# Patient Record
Sex: FEMALE | Race: WHITE | NOT HISPANIC OR LATINO | URBAN - METROPOLITAN AREA
[De-identification: names, ages, dates, MRNs, and addresses within clinical notes are randomized per-mention and may not be internally consistent; named-entity substitution may affect disease eponyms.]

---

## 2019-04-22 NOTE — PATIENT DISCUSSION
Continue: erythromycin (erythromycin): ointment: 5 mg/gram (0.5 %) a small amount twice a day as directed into both eyes 04-

## 2019-08-05 NOTE — PATIENT DISCUSSION
DRY EYES : Discussed with patient the importance of keeping the eye moist and the symptoms associated with dry eyes including blurry vision, tearing, burning, and kristan sensation. Advised patient to minimize use of any fans blowing directly on the face. Advised patient to continue with artificial tears 2-3 times daily.

## 2020-11-23 NOTE — PATIENT DISCUSSION
mythenia gravis ou; Reffering out to Dr. Benitez Latham with [facility]  [phone]

## 2020-11-23 NOTE — PATIENT DISCUSSION
Myasthenia gravis is a chronic autoimmune, neuromuscular disease that causes weakness in the skeletal muscles that worsens after periods of activity and improves after periods of rest. These muscles are responsible for functions involving breathing and moving parts of the body, including the arms and legs.

## 2020-11-23 NOTE — PATIENT DISCUSSION
Patient had Ptosis repair on 4/17/2019 she states that her lid height fluctuates . Ice pack was applied over eye's in office today and lid height raised.

## 2021-02-25 ENCOUNTER — IMPORTED ENCOUNTER (OUTPATIENT)
Dept: URBAN - METROPOLITAN AREA CLINIC 50 | Facility: CLINIC | Age: 81
End: 2021-02-25

## 2021-04-17 ASSESSMENT — TONOMETRY
OS_IOP_MMHG: 13
OD_IOP_MMHG: 14

## 2021-04-17 ASSESSMENT — VISUAL ACUITY
OD_OTHER: 20/40. 20/50.
OD_CC: 20/70
OS_BAT: 20/30
OS_CC: J1
OD_CC: J1
OS_OTHER: 20/30. 20/40.
OS_CC: 20/30-
OD_BAT: 20/40

## 2022-02-17 ENCOUNTER — FOLLOW UP (OUTPATIENT)
Dept: URBAN - METROPOLITAN AREA CLINIC 49 | Facility: CLINIC | Age: 82
End: 2022-02-17

## 2022-02-17 DIAGNOSIS — H04.123: ICD-10-CM

## 2022-02-17 DIAGNOSIS — H43.813: ICD-10-CM

## 2022-02-17 PROCEDURE — 92014 COMPRE OPH EXAM EST PT 1/>: CPT

## 2022-02-17 ASSESSMENT — VISUAL ACUITY
OD_CC: 20/30
OS_PH: 20/30
OS_CC: 20/40

## 2022-02-17 ASSESSMENT — TONOMETRY
OD_IOP_MMHG: 14
OS_IOP_MMHG: 14

## 2022-09-14 ENCOUNTER — COMPREHENSIVE EXAM (OUTPATIENT)
Dept: URBAN - METROPOLITAN AREA CLINIC 49 | Facility: CLINIC | Age: 82
End: 2022-09-14

## 2022-09-14 DIAGNOSIS — H04.123: ICD-10-CM

## 2022-09-14 DIAGNOSIS — H43.813: ICD-10-CM

## 2022-09-14 DIAGNOSIS — H51.11: ICD-10-CM

## 2022-09-14 PROCEDURE — 92134 CPTRZ OPH DX IMG PST SGM RTA: CPT

## 2022-09-14 PROCEDURE — 92014 COMPRE OPH EXAM EST PT 1/>: CPT

## 2022-09-14 PROCEDURE — 92015 DETERMINE REFRACTIVE STATE: CPT

## 2022-09-14 ASSESSMENT — VISUAL ACUITY
OD_CC: 20/30-2
OS_CC: 20/30-2
OU_CC: J1-2

## 2022-09-14 ASSESSMENT — TONOMETRY
OD_IOP_MMHG: 12
OS_IOP_MMHG: 12

## 2022-09-14 NOTE — PATIENT DISCUSSION
Patient was billed for refraction, advised not to get new glasses until she was seen by an Optom for a Motility Check.

## 2023-01-05 ENCOUNTER — CONTACT LENSES/GLASSES VISIT (OUTPATIENT)
Dept: URBAN - METROPOLITAN AREA CLINIC 53 | Facility: CLINIC | Age: 83
End: 2023-01-05

## 2023-01-05 DIAGNOSIS — H53.2: ICD-10-CM

## 2023-01-05 DIAGNOSIS — H52.4: ICD-10-CM

## 2023-01-05 PROCEDURE — 92060 SENSORIMOTOR EXAMINATION: CPT

## 2023-01-05 PROCEDURE — 92015GRNC REFRACTION GLASSES RECHECK NO CHARGE

## 2023-01-05 ASSESSMENT — VISUAL ACUITY
OD_CC: 20/30+2
OS_PH: 20/25-1 PUSH
OU_CC: J1+
OU_CC: 20/25-1
OS_CC: 20/30+2

## 2023-01-05 NOTE — PATIENT DISCUSSION
Start PF artificial tears 2-3x a day OU. Start warm compresses 1-2x a day OU. Discussed that dryness can affect vision. Recommend 20/20/20 rule when reading.

## 2023-01-05 NOTE — PATIENT DISCUSSION
Patient complains of intermittent diplopia/shadowing at near only after reading for a long period of time. Blinking and artificial tears improve vision. EOMs full, no APD. Exophoria X (at near neutralized with 4 base in), normal for near. Ortho at distance. Trialed BI prism at near and patient did not notice any difference. Patient stated BI prism at distance made vision worse. Patient denies diplopia/shadowing thoughout exam. Discussed option of trialing BI prism at near but patient would need NVOs. Patient prefers progressives. Dry eye is likely contributing to vision complaints and shadowing.  New glasses rx found and given. Not proceeding with prism at this time. Recommend start dry eye regiment. RTC if worsening of symptoms.

## 2023-10-18 ENCOUNTER — COMPREHENSIVE EXAM (OUTPATIENT)
Dept: URBAN - METROPOLITAN AREA CLINIC 49 | Facility: LOCATION | Age: 83
End: 2023-10-18

## 2023-10-18 DIAGNOSIS — H04.123: ICD-10-CM

## 2023-10-18 DIAGNOSIS — H02.834: ICD-10-CM

## 2023-10-18 DIAGNOSIS — H02.831: ICD-10-CM

## 2023-10-18 DIAGNOSIS — H35.033: ICD-10-CM

## 2023-10-18 PROCEDURE — 99214 OFFICE O/P EST MOD 30 MIN: CPT

## 2023-10-18 ASSESSMENT — VISUAL ACUITY
OS_CC: 20/25-2
OU_CC: J1@16"
OD_CC: 20/25-2
OU_CC: 20/25+2

## 2023-10-18 ASSESSMENT — TONOMETRY
OS_IOP_MMHG: 08
OD_IOP_MMHG: 08

## 2024-10-23 ENCOUNTER — COMPREHENSIVE EXAM (OUTPATIENT)
Dept: URBAN - METROPOLITAN AREA CLINIC 49 | Facility: LOCATION | Age: 84
End: 2024-10-23

## 2024-10-23 DIAGNOSIS — H35.033: ICD-10-CM

## 2024-10-23 DIAGNOSIS — H53.2: ICD-10-CM

## 2024-10-23 DIAGNOSIS — H02.831: ICD-10-CM

## 2024-10-23 DIAGNOSIS — H43.813: ICD-10-CM

## 2024-10-23 DIAGNOSIS — H35.361: ICD-10-CM

## 2024-10-23 DIAGNOSIS — H04.123: ICD-10-CM

## 2024-10-23 DIAGNOSIS — H52.4: ICD-10-CM

## 2024-10-23 PROCEDURE — 92134 CPTRZ OPH DX IMG PST SGM RTA: CPT

## 2024-10-23 PROCEDURE — 92015 DETERMINE REFRACTIVE STATE: CPT

## 2024-10-23 PROCEDURE — 99214 OFFICE O/P EST MOD 30 MIN: CPT
